# Patient Record
Sex: FEMALE | Race: WHITE | Employment: FULL TIME | URBAN - NONMETROPOLITAN AREA
[De-identification: names, ages, dates, MRNs, and addresses within clinical notes are randomized per-mention and may not be internally consistent; named-entity substitution may affect disease eponyms.]

---

## 2023-11-20 ENCOUNTER — OFFICE VISIT (OUTPATIENT)
Dept: PRIMARY CARE CLINIC | Age: 38
End: 2023-11-20
Payer: COMMERCIAL

## 2023-11-20 VITALS
SYSTOLIC BLOOD PRESSURE: 128 MMHG | BODY MASS INDEX: 43.49 KG/M2 | TEMPERATURE: 97.4 F | HEIGHT: 66 IN | DIASTOLIC BLOOD PRESSURE: 86 MMHG | WEIGHT: 270.6 LBS | RESPIRATION RATE: 18 BRPM | OXYGEN SATURATION: 98 % | HEART RATE: 95 BPM

## 2023-11-20 DIAGNOSIS — N92.1 EXCESSIVE AND FREQUENT MENSTRUATION WITH IRREGULAR CYCLE: ICD-10-CM

## 2023-11-20 DIAGNOSIS — E66.01 MORBID OBESITY (HCC): Primary | ICD-10-CM

## 2023-11-20 DIAGNOSIS — Z13.220 SCREENING FOR LIPID DISORDERS: ICD-10-CM

## 2023-11-20 DIAGNOSIS — E03.9 HYPOTHYROIDISM, UNSPECIFIED TYPE: ICD-10-CM

## 2023-11-20 DIAGNOSIS — E66.01 MORBID OBESITY (HCC): ICD-10-CM

## 2023-11-20 DIAGNOSIS — Z13.1 SCREENING FOR DIABETES MELLITUS (DM): ICD-10-CM

## 2023-11-20 LAB
ALBUMIN SERPL-MCNC: 4.3 G/DL (ref 3.5–5.2)
ALP SERPL-CCNC: 100 U/L (ref 35–104)
ALT SERPL-CCNC: 19 U/L (ref 5–33)
ANION GAP SERPL CALCULATED.3IONS-SCNC: 13 MMOL/L (ref 7–19)
AST SERPL-CCNC: 17 U/L (ref 5–32)
BASOPHILS # BLD: 0 K/UL (ref 0–0.2)
BASOPHILS NFR BLD: 0.5 % (ref 0–1)
BILIRUB SERPL-MCNC: 0.4 MG/DL (ref 0.2–1.2)
BUN SERPL-MCNC: 8 MG/DL (ref 6–20)
CALCIUM SERPL-MCNC: 9.3 MG/DL (ref 8.6–10)
CHLORIDE SERPL-SCNC: 104 MMOL/L (ref 98–111)
CHOLEST SERPL-MCNC: 204 MG/DL (ref 160–199)
CO2 SERPL-SCNC: 23 MMOL/L (ref 22–29)
CREAT SERPL-MCNC: 0.6 MG/DL (ref 0.5–0.9)
EOSINOPHIL # BLD: 0.2 K/UL (ref 0–0.6)
EOSINOPHIL NFR BLD: 2 % (ref 0–5)
ERYTHROCYTE [DISTWIDTH] IN BLOOD BY AUTOMATED COUNT: 13.5 % (ref 11.5–14.5)
ESTRADIOL SERPL-SCNC: 177.6 PG/ML
FSH SERPL-SCNC: 1.6 MIU/ML
GLUCOSE SERPL-MCNC: 96 MG/DL (ref 74–109)
HBA1C MFR BLD: 5.6 % (ref 4–6)
HCT VFR BLD AUTO: 41.3 % (ref 37–47)
HDLC SERPL-MCNC: 58 MG/DL (ref 65–121)
HGB BLD-MCNC: 13.1 G/DL (ref 12–16)
IMM GRANULOCYTES # BLD: 0 K/UL
LDLC SERPL CALC-MCNC: 126 MG/DL
LH SERPL-ACNC: 1.7 MIU/ML
LYMPHOCYTES # BLD: 2.9 K/UL (ref 1.1–4.5)
LYMPHOCYTES NFR BLD: 33.5 % (ref 20–40)
MCH RBC QN AUTO: 28.6 PG (ref 27–31)
MCHC RBC AUTO-ENTMCNC: 31.7 G/DL (ref 33–37)
MCV RBC AUTO: 90.2 FL (ref 81–99)
MONOCYTES # BLD: 0.4 K/UL (ref 0–0.9)
MONOCYTES NFR BLD: 4.4 % (ref 0–10)
NEUTROPHILS # BLD: 5.1 K/UL (ref 1.5–7.5)
NEUTS SEG NFR BLD: 59.4 % (ref 50–65)
PLATELET # BLD AUTO: 258 K/UL (ref 130–400)
PMV BLD AUTO: 11 FL (ref 9.4–12.3)
POTASSIUM SERPL-SCNC: 4.1 MMOL/L (ref 3.5–5)
PROGEST SERPL-MCNC: 4.59 NG/ML
PROT SERPL-MCNC: 7.9 G/DL (ref 6.6–8.7)
RBC # BLD AUTO: 4.58 M/UL (ref 4.2–5.4)
SODIUM SERPL-SCNC: 140 MMOL/L (ref 136–145)
T4 FREE SERPL-MCNC: 0.92 NG/DL (ref 0.93–1.7)
TRIGL SERPL-MCNC: 102 MG/DL (ref 0–149)
TSH SERPL DL<=0.005 MIU/L-ACNC: 3.64 UIU/ML (ref 0.27–4.2)
WBC # BLD AUTO: 8.6 K/UL (ref 4.8–10.8)

## 2023-11-20 PROCEDURE — 99204 OFFICE O/P NEW MOD 45 MIN: CPT | Performed by: NURSE PRACTITIONER

## 2023-11-20 SDOH — ECONOMIC STABILITY: FOOD INSECURITY: WITHIN THE PAST 12 MONTHS, YOU WORRIED THAT YOUR FOOD WOULD RUN OUT BEFORE YOU GOT MONEY TO BUY MORE.: NEVER TRUE

## 2023-11-20 SDOH — ECONOMIC STABILITY: INCOME INSECURITY: HOW HARD IS IT FOR YOU TO PAY FOR THE VERY BASICS LIKE FOOD, HOUSING, MEDICAL CARE, AND HEATING?: NOT VERY HARD

## 2023-11-20 SDOH — ECONOMIC STABILITY: HOUSING INSECURITY
IN THE LAST 12 MONTHS, WAS THERE A TIME WHEN YOU DID NOT HAVE A STEADY PLACE TO SLEEP OR SLEPT IN A SHELTER (INCLUDING NOW)?: NO

## 2023-11-20 SDOH — ECONOMIC STABILITY: FOOD INSECURITY: WITHIN THE PAST 12 MONTHS, THE FOOD YOU BOUGHT JUST DIDN'T LAST AND YOU DIDN'T HAVE MONEY TO GET MORE.: NEVER TRUE

## 2023-11-20 ASSESSMENT — PATIENT HEALTH QUESTIONNAIRE - PHQ9
SUM OF ALL RESPONSES TO PHQ QUESTIONS 1-9: 0
SUM OF ALL RESPONSES TO PHQ QUESTIONS 1-9: 0
1. LITTLE INTEREST OR PLEASURE IN DOING THINGS: 0
SUM OF ALL RESPONSES TO PHQ9 QUESTIONS 1 & 2: 0
SUM OF ALL RESPONSES TO PHQ QUESTIONS 1-9: 0
2. FEELING DOWN, DEPRESSED OR HOPELESS: 0
SUM OF ALL RESPONSES TO PHQ QUESTIONS 1-9: 0

## 2023-11-20 ASSESSMENT — ENCOUNTER SYMPTOMS
EYES NEGATIVE: 1
GASTROINTESTINAL NEGATIVE: 1
ABDOMINAL PAIN: 0
COUGH: 0
ABDOMINAL DISTENTION: 0
NAUSEA: 0
SHORTNESS OF BREATH: 0
VOMITING: 0
CONSTIPATION: 0
DIARRHEA: 0
WHEEZING: 0

## 2023-11-20 NOTE — PATIENT INSTRUCTIONS
Patient is encouraged to consider a local training or weight loss program such as:  [place] on [address] or [address]. [facility]  Dr. Kevin Shone Weight Loss program  Or other weight loss programs through a local gym. [employer]    Accountability and consistency are two keys to success to long term weight loss. BMR    10 x (weight in kilogram) + 6.25 (height in centimeters) - 5 (age) - 161 = BMR  BMR x 1.2 ( for those who are not overly active) = caloric deficit     The fundamentals of weight loss come down to the fact that calories in have to be less than calories out which results in a calorie deficit. However healthy weight loss will also require you to meet your basal metabolic rate. While some of us eat way too many calories, others eat way too few causing the body to store most intake due to metabolic adaptation. Increase water intake with a goal of 1 gallon per day. Download a calorie counting camille such as my fitness pal or other program.    Keep a journal of everything that you eat or drink for 2 weeks. At the end of each day calculate your caloric intake. This gives you knowledge of what you are actually getting in each day. By calculating your BMR you will know the amount of calories that your body needs every day to function appropriately. Focus on making better nutritional choices, eliminate simple sugars or sugary drinks such as soda or tea. Limit starchy vegetables such as potatoes or corn. Use healthier choices of breads and pastas such as Banza pasta, Manish bread, low-carb wraps or keto bread. Eating plans such as the [address], [employer] or [address] can be implemented to help guide eating habits. Slowly increase physical exercise to include 20 to 30 minutes of moderate exercise 3 to 5 days a week.

## 2023-11-20 NOTE — PROGRESS NOTES
[facility] PHYSICIAN SERVICES  [address] [address] [address] [address]  Dept: [phone]  Dept Fax: [phone]  Loc: [phone]    Ron Ryan is a 34 y.o. female who presents today for her medical conditions/complaints as noted below. Ron Ryan is c/o of New Patient (Pt  is here to establish care. Pt is fasting. ), Weight Management (Pt would like to discuss her weight. Pt states she was dx with hypothyroidism and  was started on synthroid and kept gaining weight. Pt states she got fed up and slowly got fed up and stopped taking the medication and has still gained weight. ), and Menstrual Problem (Pt states her period has started coming every 2 weeks. )        HPI:     HPI this 68-year-old female presents today to establish care. She states that she has concerns with her weight. She states that she was diagnosed as having hypothyroidism years ago. States that she got the run around from several different physicians. She states that she got fed up with the medical system and stopped everything. States she also has issues with menstrual cycles now coming every 2 weeks instead of once a month. She states that she has significant bleeding and cramping with these. States she has her entire life. She states that she is not on any type of birth control at this time. She is not taking any medication for thyroid at this time. Chief Complaint   Patient presents with    New Patient     Pt  is here to establish care. Pt is fasting. Weight Management     Pt would like to discuss her weight. Pt states she was dx with hypothyroidism and  was started on synthroid and kept gaining weight. Pt states she got fed up and slowly got fed up and stopped taking the medication and has still gained weight. Menstrual Problem     Pt states her period has started coming every 2 weeks.       Past Medical History:   Diagnosis Date    Hypothyroidism       Past Surgical History:   Procedure

## 2023-11-23 LAB
INSULIN FREE SERPL-ACNC: 13 UIU/ML (ref 3–25)
INSULIN SERPL-ACNC: 18 UIU/ML (ref 3–25)

## 2023-11-25 LAB
SHBG SERPL-SCNC: 54 NMOL/L (ref 25–122)
TESTOST FREE SERPL-MCNC: 2.5 PG/ML (ref 1.3–9.2)
TESTOST SERPL-MCNC: 20 NG/DL (ref 9–55)

## 2023-12-05 ENCOUNTER — OFFICE VISIT (OUTPATIENT)
Dept: PRIMARY CARE CLINIC | Age: 38
End: 2023-12-05
Payer: COMMERCIAL

## 2023-12-05 VITALS
WEIGHT: 269.6 LBS | RESPIRATION RATE: 18 BRPM | TEMPERATURE: 98.6 F | HEIGHT: 66 IN | OXYGEN SATURATION: 97 % | DIASTOLIC BLOOD PRESSURE: 80 MMHG | HEART RATE: 104 BPM | BODY MASS INDEX: 43.33 KG/M2 | SYSTOLIC BLOOD PRESSURE: 124 MMHG

## 2023-12-05 DIAGNOSIS — H60.503 ACUTE OTITIS EXTERNA OF BOTH EARS, UNSPECIFIED TYPE: Primary | ICD-10-CM

## 2023-12-05 DIAGNOSIS — R05.9 COUGH, UNSPECIFIED TYPE: ICD-10-CM

## 2023-12-05 DIAGNOSIS — R50.9 FEVER, UNSPECIFIED FEVER CAUSE: ICD-10-CM

## 2023-12-05 DIAGNOSIS — B34.9 VIRAL SYNDROME: ICD-10-CM

## 2023-12-05 DIAGNOSIS — R09.81 CONGESTION OF NASAL SINUS: ICD-10-CM

## 2023-12-05 LAB
INFLUENZA A ANTIBODY: NEGATIVE
INFLUENZA B ANTIBODY: NEGATIVE
S PYO AG THROAT QL: NORMAL

## 2023-12-05 PROCEDURE — 99213 OFFICE O/P EST LOW 20 MIN: CPT | Performed by: NURSE PRACTITIONER

## 2023-12-05 RX ORDER — ACETAMINOPHEN 500 MG
500 TABLET ORAL EVERY 6 HOURS PRN
COMMUNITY

## 2023-12-05 RX ORDER — FLUTICASONE PROPIONATE 50 MCG
1 SPRAY, SUSPENSION (ML) NASAL DAILY
Qty: 32 G | Refills: 1 | Status: SHIPPED | OUTPATIENT
Start: 2023-12-05

## 2023-12-05 RX ORDER — NEOMYCIN SULFATE, POLYMYXIN B SULFATE AND HYDROCORTISONE 10; 3.5; 1 MG/ML; MG/ML; [USP'U]/ML
3 SUSPENSION/ DROPS AURICULAR (OTIC) 4 TIMES DAILY
Qty: 1 EACH | Refills: 0 | Status: SHIPPED | OUTPATIENT
Start: 2023-12-05 | End: 2023-12-15

## 2023-12-05 RX ORDER — METHYLPREDNISOLONE 4 MG/1
TABLET ORAL
Qty: 1 KIT | Refills: 0 | Status: SHIPPED | OUTPATIENT
Start: 2023-12-05 | End: 2023-12-11

## 2023-12-05 RX ORDER — ACETAMINOPHEN, GUAIFENESIN 325; 200 MG/1; MG/1
CAPSULE, LIQUID FILLED ORAL
COMMUNITY

## 2023-12-05 ASSESSMENT — ENCOUNTER SYMPTOMS
RHINORRHEA: 1
EYES NEGATIVE: 1
ABDOMINAL PAIN: 0
ALLERGIC/IMMUNOLOGIC NEGATIVE: 1
SORE THROAT: 0
NAUSEA: 0
DIARRHEA: 0
COUGH: 1
GASTROINTESTINAL NEGATIVE: 1
RECTAL PAIN: 0

## 2023-12-05 NOTE — PATIENT INSTRUCTIONS
Viral syndrome   Generally, I recommend Flonase daily for 14 days along with a potent antihistamine such as Allegra D, Zyrtec D or Claritin D for 14 days. If you have Blood pressure problems you may not be able to tolerate the D version. Other OTC nasal sprays such as saline spray, Vicks or Naso bull can also be helpful. People with high blood pressure may use Coricidin HBP or Benadryl for sinus   symptoms. Oscillococcinum may be helpful for flu or flu-like symptoms. Supplement with Vit D3 5000 units daily, Vit C 1000 mg daily and Zinc 50 mg daily. Many illnesses are caused by viruses. These conditions usually run their course in 7-14 days. Antibiotics do not help fight viral infections and are not needed at this time. Viral syndromes are treated with symptomatic support. You may take tylenol or ibuprofen for fever or aches and pains. Stay hydrated by taking sips of water or non caffeinated, noncarbonated, and nonalcoholic beverages throughout the day. For sore throat, you may gargle with warm salt water, use lozenges or sprays. Hot tea with honey may also be soothing to the throat. Using a daily antihistamine such as Claritin, Zyrtec or Allegra can help with upper respiratory symptoms. Benadryl can be sedating but is helpful at drying secretions and may be taken at night. For earaches, microwave a wet washcloth for 2 mins then using tongs (do not touch as it may scald you ) place cloth in ceramic cup and hold up to ear. The moist heat can be soothing to the ear. Call if you have a fever greater than 102 F or if symptoms do not improve. Your provider may also send you in prescription medications depending on your symptoms and their severity. Take all medications as directed on package unless specifically told otherwise.

## 2024-01-08 ENCOUNTER — OFFICE VISIT (OUTPATIENT)
Dept: PRIMARY CARE CLINIC | Age: 39
End: 2024-01-08
Payer: COMMERCIAL

## 2024-01-08 VITALS
TEMPERATURE: 97 F | HEIGHT: 66 IN | OXYGEN SATURATION: 98 % | RESPIRATION RATE: 18 BRPM | WEIGHT: 268.2 LBS | HEART RATE: 91 BPM | DIASTOLIC BLOOD PRESSURE: 76 MMHG | BODY MASS INDEX: 43.1 KG/M2 | SYSTOLIC BLOOD PRESSURE: 122 MMHG

## 2024-01-08 DIAGNOSIS — E03.9 HYPOTHYROIDISM, UNSPECIFIED TYPE: ICD-10-CM

## 2024-01-08 DIAGNOSIS — E66.01 MORBID OBESITY (HCC): Primary | ICD-10-CM

## 2024-01-08 PROCEDURE — 99213 OFFICE O/P EST LOW 20 MIN: CPT | Performed by: NURSE PRACTITIONER

## 2024-01-08 ASSESSMENT — ENCOUNTER SYMPTOMS
COUGH: 0
EYES NEGATIVE: 1
NAUSEA: 0
SHORTNESS OF BREATH: 0
ALLERGIC/IMMUNOLOGIC NEGATIVE: 1
GASTROINTESTINAL NEGATIVE: 1
WHEEZING: 0
VOMITING: 0
ABDOMINAL PAIN: 0
RESPIRATORY NEGATIVE: 1
CONSTIPATION: 0
DIARRHEA: 0

## 2024-01-08 ASSESSMENT — PATIENT HEALTH QUESTIONNAIRE - PHQ9
1. LITTLE INTEREST OR PLEASURE IN DOING THINGS: 0
SUM OF ALL RESPONSES TO PHQ QUESTIONS 1-9: 0
SUM OF ALL RESPONSES TO PHQ QUESTIONS 1-9: 0
SUM OF ALL RESPONSES TO PHQ9 QUESTIONS 1 & 2: 0
SUM OF ALL RESPONSES TO PHQ QUESTIONS 1-9: 0
2. FEELING DOWN, DEPRESSED OR HOPELESS: 0
SUM OF ALL RESPONSES TO PHQ QUESTIONS 1-9: 0

## 2024-01-08 NOTE — PATIENT INSTRUCTIONS
Mediterranean diet, Adrenaline Mobility diet or Trim Healthy Mama can be implemented to help guide eating habits.      Slowly increase physical exercise to include 20 to 30 minutes of moderate exercise 3 to 5 days a week.      The choice of anti-obesity drug depends upon patient comorbidities but also takes into account patient preferences, adverse effects, and insurance coverage and out-of-pocket costs. For most patients, a glucagon-like peptide 1 (GLP-1) agonist (eg, semaglutide. liraglutide) is the preferred first-line pharmacotherapy. Other options include combination phentermine-topiramate, orlistat, combination bupropion-naltrexone, and phentermine (as a single agent). The details of pharmacotherapy for the management of obesity are reviewed elsewhere. (See \"Obesity in adults: Drug therapy\", section on 'Sympathomimetic drugs' and \"Obesity in adults: Drug therapy\".)

## 2024-01-08 NOTE — PROGRESS NOTES
side effects of prescribed medications.  All patient questions answered.  Pt voiced understanding. Reviewed health maintenance.  Instructed to continue currentmedications, diet and exercise.  Patient agreed with treatment plan. Follow up as directed.   MEDICATIONS:  No orders of the defined types were placed in this encounter.        ORDERS:  No orders of the defined types were placed in this encounter.      Follow-up:  Return in about 4 weeks (around 2/5/2024).    PATIENT INSTRUCTIONS:  Patient Instructions   Water goal 128 oz daily  Protein > 100 gms daily       Patient is encouraged to consider a local training or weight loss program such as:  [employer] on [employer] or [employer]  [facility] program  Or other weight loss programs through a local gym.     I would recommend discussing options with a local weight loss clinic.  You may be able to qualify for one of the compounded formulas of one of the injectables through their programs. One possible consideration would be:    Janet BULLOCK   Weight management   [email or web address]      Accountability and consistency are two keys to success to long term weight loss.     BMR    10 x (weight in kilogram) + 6.25 (height in centimeters) - 5 (age) - 161 = BMR  BMR x 1.2 ( for those who are not overly active) = caloric deficit     The fundamentals of weight loss come down to the fact that calories in have to be less than calories out which results in a calorie deficit.  However healthy weight loss will also require you to meet your basal metabolic rate.  While some of us eat way too many calories, others eat way too few causing the body to store most intake due to metabolic adaptation.     Increase water intake with a goal of 1 gallon per day.    Download a calorie counting camille such as [facility] pal or other program.    Keep a journal of everything that you eat or drink for 2 weeks.    At the end of each day calculate

## 2024-04-08 ENCOUNTER — OFFICE VISIT (OUTPATIENT)
Dept: PRIMARY CARE CLINIC | Age: 39
End: 2024-04-08
Payer: COMMERCIAL

## 2024-04-08 VITALS
BODY MASS INDEX: 44.81 KG/M2 | TEMPERATURE: 97.3 F | OXYGEN SATURATION: 99 % | DIASTOLIC BLOOD PRESSURE: 86 MMHG | WEIGHT: 278.8 LBS | HEART RATE: 88 BPM | RESPIRATION RATE: 18 BRPM | SYSTOLIC BLOOD PRESSURE: 134 MMHG | HEIGHT: 66 IN

## 2024-04-08 DIAGNOSIS — Z12.4 ENCOUNTER FOR PAPANICOLAOU SMEAR FOR CERVICAL CANCER SCREENING: Primary | ICD-10-CM

## 2024-04-08 DIAGNOSIS — E66.01 MORBID OBESITY (HCC): ICD-10-CM

## 2024-04-08 DIAGNOSIS — N92.6 IRREGULAR MENSTRUAL CYCLE: ICD-10-CM

## 2024-04-08 DIAGNOSIS — R21 RASH: ICD-10-CM

## 2024-04-08 DIAGNOSIS — Z79.899 MEDICATION MANAGEMENT: ICD-10-CM

## 2024-04-08 PROCEDURE — 80305 DRUG TEST PRSMV DIR OPT OBS: CPT | Performed by: NURSE PRACTITIONER

## 2024-04-08 PROCEDURE — 99214 OFFICE O/P EST MOD 30 MIN: CPT | Performed by: NURSE PRACTITIONER

## 2024-04-08 RX ORDER — PHENTERMINE HYDROCHLORIDE 37.5 MG/1
37.5 TABLET ORAL
Qty: 30 TABLET | Refills: 0 | Status: SHIPPED | OUTPATIENT
Start: 2024-04-08 | End: 2024-05-08

## 2024-04-08 ASSESSMENT — ENCOUNTER SYMPTOMS
EYES NEGATIVE: 1
CONSTIPATION: 0
RESPIRATORY NEGATIVE: 1
NAUSEA: 0
DIARRHEA: 0
SHORTNESS OF BREATH: 0
VOMITING: 0
ABDOMINAL PAIN: 0
COUGH: 0
ABDOMINAL DISTENTION: 0
WHEEZING: 0
ALLERGIC/IMMUNOLOGIC NEGATIVE: 1
GASTROINTESTINAL NEGATIVE: 1

## 2024-04-08 NOTE — PROGRESS NOTES
[facility]  [place] [id number] [facility]  [place] [address]  Dept: [phone]  Dept Fax: [phone]  Loc: [phone]    Rani Rossi is a 38 y.o. female who presents today for her medical conditions/complaints as noted below.  Rani Rossi is c/o of 3 Month Follow-Up (Pt is here for 3 month follow up on medication. Pt states the metformin has helped regulate her period, but has not had any GI issues. She states she has changed to the mediterranean diet, but is still gaining weight. ) and Rash (PT has developed a rash on her forehead and her nose that she first noticed about a month ago. Pt states it does itch. Pt had started a cream in December so she stopped it about a month go, but still has the rash. )        HPI:     HPI this 38-year-old female presents today for 3-month follow-up.  She states that the metformin has regulated her irregular menstrual cycle.  States that she has recurrence clockwork now.  She does report that she developed a rash on her forehead that was itchy after starting the metformin.  States that she also started Allegra-D about that time and states that it was not there while she was taking the Allegra-D but did start back after she stopped it.  She does not know if it is related to the metformin or she had also started new facial cleansers at that time.  Chief Complaint   Patient presents with    3 Month Follow-Up     Pt is here for 3 month follow up on medication. Pt states the metformin has helped regulate her period, but has not had any GI issues. She states she has changed to the mediterranean diet, but is still gaining weight.     Rash     PT has developed a rash on her forehead and her nose that she first noticed about a month ago. Pt states it does itch. Pt had started a cream in December so she stopped it about a month go, but still has the rash.      Past Medical History:   Diagnosis Date    Hypothyroidism       Past Surgical History:

## 2024-04-08 NOTE — PATIENT INSTRUCTIONS
Goal protein   Goal water   Patient is encouraged to consider a local training or weight loss program such as:  [employer] on [employer] or [employer]  [facility] program  Or other weight loss programs through a local gym.     Accountability and consistency are two keys to success to long term weight loss.     BMR    10 x (weight in kilogram) + 6.25 (height in centimeters) - 5 (age) - 161 = BMR  BMR x 1.2 ( for those who are not overly active) = caloric deficit     The fundamentals of weight loss come down to the fact that calories in have to be less than calories out which results in a calorie deficit.  However healthy weight loss will also require you to meet your basal metabolic rate.  While some of us eat way too many calories, others eat way too few causing the body to store most intake due to metabolic adaptation.     Increase water intake with a goal of 1 gallon per day.    Download a calorie counting camille such as my [email or web address] pal or other program.    Keep a journal of everything that you eat or drink for 2 weeks.    At the end of each day calculate your caloric intake.  This gives you knowledge of what you are actually getting in each day.  By calculating your BMR you will know the amount of calories that your body needs every day to function appropriately.  Focus on making better nutritional choices, eliminate simple sugars or sugary drinks such as soda or tea.  Limit starchy vegetables such as potatoes or corn.  Use healthier choices of breads and pastas such as Banza pasta, Manish bread, low-carb wraps or keto bread.     Eating plans such as the Mediterranean diet, [place] diet or Trim Healthy Mama can be implemented to help guide eating habits.      Slowly increase physical exercise to include 20 to 30 minutes of moderate exercise 3 to 5 days a week.

## 2024-04-16 ENCOUNTER — NURSE ONLY (OUTPATIENT)
Dept: PRIMARY CARE CLINIC | Age: 39
End: 2024-04-16

## 2024-04-16 VITALS — BODY MASS INDEX: 44 KG/M2 | WEIGHT: 272.6 LBS

## 2024-04-23 ENCOUNTER — NURSE ONLY (OUTPATIENT)
Dept: PRIMARY CARE CLINIC | Age: 39
End: 2024-04-23

## 2024-04-23 VITALS — BODY MASS INDEX: 43.17 KG/M2 | HEIGHT: 66 IN | WEIGHT: 268.6 LBS

## 2024-04-23 NOTE — PROGRESS NOTES
Patient was seen today for weight check. She is taking her metformin and phentermine as directed. Notified pt that PCP would be notified. Patient voiced understanding.

## 2024-04-30 ENCOUNTER — NURSE ONLY (OUTPATIENT)
Dept: PRIMARY CARE CLINIC | Age: 39
End: 2024-04-30

## 2024-04-30 VITALS — BODY MASS INDEX: 43.09 KG/M2 | WEIGHT: 267 LBS

## 2024-05-10 ENCOUNTER — OFFICE VISIT (OUTPATIENT)
Dept: PRIMARY CARE CLINIC | Age: 39
End: 2024-05-10
Payer: COMMERCIAL

## 2024-05-10 VITALS
RESPIRATION RATE: 18 BRPM | HEIGHT: 66 IN | WEIGHT: 264 LBS | BODY MASS INDEX: 42.43 KG/M2 | TEMPERATURE: 97.6 F | SYSTOLIC BLOOD PRESSURE: 118 MMHG | DIASTOLIC BLOOD PRESSURE: 76 MMHG | OXYGEN SATURATION: 97 % | HEART RATE: 81 BPM

## 2024-05-10 DIAGNOSIS — E66.01 MORBID OBESITY (HCC): Primary | ICD-10-CM

## 2024-05-10 PROCEDURE — 99214 OFFICE O/P EST MOD 30 MIN: CPT | Performed by: NURSE PRACTITIONER

## 2024-05-10 RX ORDER — PHENTERMINE HYDROCHLORIDE 37.5 MG/1
37.5 TABLET ORAL
COMMUNITY
End: 2024-05-10 | Stop reason: SDUPTHER

## 2024-05-10 NOTE — PROGRESS NOTES
[facility]  [place] [id number] [facility]  [place] [address]  Dept: [phone]  Dept Fax: [phone]  Loc: [phone]    Rani Rossi is a 38 y.o. female who presents today for her medical conditions/complaints as noted below.  Rani Rossi is c/o of 1 Month Follow-Up (Pt is here for 1 month follow up on the phentermine. Pt states she feels more energetic and feels good. Pt denies side effects or adverse effects. No concerns. )        HPI:     HPI this patient states that she is doing excellent on the medication.  She continues to lose weight.  States that she has been getting complements from her  which is really help boost her self-esteem.  Also states that she was able to get into Lindsay that she has not been able to wear in a long time.  She states that she feels like it is doing an excellent job helping her to curb her appetite or to have her more energy so she can do the things like increasing her activity she states that she is sleeping well.  States that she is not having any issues with her blood pressure or heart rate.  Chief Complaint   Patient presents with    1 Month Follow-Up     Pt is here for 1 month follow up on the phentermine. Pt states she feels more energetic and feels good. Pt denies side effects or adverse effects. No concerns.      Past Medical History:   Diagnosis Date    Hypothyroidism       Past Surgical History:   Procedure Laterality Date    TONSILLECTOMY             5/10/2024     8:16 AM 4/30/2024    11:19 AM 4/23/2024    11:15 AM 4/16/2024    11:15 AM 4/8/2024    11:13 AM 1/8/2024    11:02 AM   Vitals   SYSTOLIC 118    134 122   DIASTOLIC 76    86 76   Site Left Upper Arm    Left Upper Arm Left Upper Arm   Position Sitting    Sitting Sitting   Cuff Size Large Adult    Large Adult Large Adult   Pulse 81    88 91   Temp 97.6 °F (36.4 °C)    97.3 °F (36.3 °C) 97 °F (36.1 °C)   Respirations 18    18 18   SpO2 97 %    99 % 98 %   Weight - Scale

## 2024-05-10 NOTE — PATIENT INSTRUCTIONS
Patient is encouraged to consider a local training or weight loss program such as:  [employer] [employer] [place] on [employer] or [employer]  [facility] program  Or other weight loss programs through a local gym.     Accountability and consistency are two keys to success to long term weight loss.     BMR    10 x (weight in kilogram) + 6.25 (height in centimeters) - 5 (age) - 161 = BMR  BMR x 1.2 ( for those who are not overly active) = caloric deficit     The fundamentals of weight loss come down to the fact that calories in have to be less than calories out which results in a calorie deficit.  However healthy weight loss will also require you to meet your basal metabolic rate.  While some of us eat way too many calories, others eat way too few causing the body to store most intake due to metabolic adaptation.     Increase water intake with a goal of 1 gallon per day.    Download a calorie counting camille such as my fitness pal or other program.    Keep a journal of everything that you eat or drink for 2 weeks.    At the end of each day calculate your caloric intake.  This gives you knowledge of what you are actually getting in each day.  By calculating your BMR you will know the amount of calories that your body needs every day to function appropriately.  Focus on making better nutritional choices, eliminate simple sugars or sugary drinks such as soda or tea.  Limit starchy vegetables such as potatoes or corn.  Use healthier choices of breads and pastas such as Banza pasta, Manish bread, low-carb wraps or keto bread.     Eating plans such as the Mediterranean diet, [place] diet or Trim Healthy Mama can be implemented to help guide eating habits.      Slowly increase physical exercise to include 20 to 30 minutes of moderate exercise 3 to 5 days a week.

## 2024-05-13 RX ORDER — PHENTERMINE HYDROCHLORIDE 37.5 MG/1
37.5 TABLET ORAL
Qty: 30 TABLET | Refills: 0 | Status: SHIPPED | OUTPATIENT
Start: 2024-05-13 | End: 2024-06-12

## 2024-05-13 ASSESSMENT — ENCOUNTER SYMPTOMS
VOMITING: 0
DIARRHEA: 0
ABDOMINAL PAIN: 0

## 2024-06-12 DIAGNOSIS — E66.01 MORBID OBESITY (HCC): ICD-10-CM

## 2024-06-12 NOTE — TELEPHONE ENCOUNTER
PDMP Monitoring:    Last PDMP [place] as Reviewed ([place]):  Review User Review Instant Review Result   RESTREPOANDREZ JACKSONMANDA MONTIEL 5/13/2024  8:20 AM Reviewed PDMP [1]     Urine Drug Screenings (1 yr)       POCT Rapid Drug Screen  Collected: 4/8/2024 11:59 AM (Final result)                  Medication Contract and Consent for Opioid Use Documents Filed       Patient Documents       Type of Document Status Date Received Received By Description    Medication Contract Signed 12/16/2013  8:20 AM MARILUZ ADLER     Medication Contract Received 4/8/2024  5:01 PM ESCOBAR LIMON Medication Contract 4/8/2024

## 2024-06-13 RX ORDER — PHENTERMINE HYDROCHLORIDE 37.5 MG/1
37.5 TABLET ORAL
Qty: 30 TABLET | Refills: 0 | Status: SHIPPED | OUTPATIENT
Start: 2024-06-13 | End: 2024-07-13

## 2024-08-08 ENCOUNTER — OFFICE VISIT (OUTPATIENT)
Dept: PRIMARY CARE CLINIC | Age: 39
End: 2024-08-08
Payer: COMMERCIAL

## 2024-08-08 VITALS
TEMPERATURE: 97.7 F | OXYGEN SATURATION: 98 % | DIASTOLIC BLOOD PRESSURE: 82 MMHG | WEIGHT: 251.2 LBS | SYSTOLIC BLOOD PRESSURE: 116 MMHG | BODY MASS INDEX: 40.37 KG/M2 | HEIGHT: 66 IN | HEART RATE: 92 BPM | RESPIRATION RATE: 18 BRPM

## 2024-08-08 DIAGNOSIS — E66.01 MORBID OBESITY (HCC): Primary | ICD-10-CM

## 2024-08-08 PROCEDURE — 99214 OFFICE O/P EST MOD 30 MIN: CPT | Performed by: NURSE PRACTITIONER

## 2024-08-08 NOTE — PROGRESS NOTES
[facility]  [address]  [place] [address]  Dept: [phone]  Dept Fax: [phone]  Loc: [phone]    Rani Rossi is a 39 y.o. female who presents today for her medical conditions/complaints as noted below.  Rani Rossi is c/o of 3 Month Follow-Up (Pt is here for 3 month follow up on weight management. Pt is confused. She was told to follow up and requested a refill of her phentermine, but was told she couldn't get it until she was seen. )        HPI:     HPI this 39-year-old female presents today for follow-up on her weight management.  States that she is still being very successful using the phentermine and metformin.  She states that she had tried to get her refill but was told that she had to come in before that could be happen.  Patient states that at her last visit we discussed that she could return in 3 months because she is doing so well.  And she is stable with no concern for abuse.  Chief Complaint   Patient presents with    3 Month Follow-Up     Pt is here for 3 month follow up on weight management. Pt is confused. She was told to follow up and requested a refill of her phentermine, but was told she couldn't get it until she was seen.      Past Medical History:   Diagnosis Date    Hypothyroidism       Past Surgical History:   Procedure Laterality Date    TONSILLECTOMY             8/8/2024     3:33 PM 5/10/2024     8:16 AM 4/30/2024    11:19 AM 4/23/2024    11:15 AM 4/16/2024    11:15 AM 4/8/2024    11:13 AM   Vitals   SYSTOLIC 116 118    134   DIASTOLIC 82 76    86   Site Left Upper Arm Left Upper Arm    Left Upper Arm   Position Sitting Sitting    Sitting   Cuff Size Large Adult Large Adult    Large Adult   Pulse 92 81    88   Temp 97.7 °F (36.5 °C) 97.6 °F (36.4 °C)    97.3 °F (36.3 °C)   Respirations 18 18    18   SpO2 98 % 97 %    99 %   Weight - Scale 251 lb 3.2 oz 264 lb 267 lb 268 lb 9.6 oz 272 lb 9.6 oz 278 lb 12.8 oz   Height 5' 6\" 5' 6\"

## 2024-08-08 NOTE — PATIENT INSTRUCTIONS
Patient is encouraged to consider a local training or weight loss program such as:  [employer]ie [employer] on [employer] or [employer]  [facility] program  Or other weight loss programs through a local gym.     Accountability and consistency are two keys to success to long term weight loss.     BMR    10 x (weight in kilogram) + 6.25 (height in centimeters) - 5 (age) - 161 = BMR  BMR x 1.2 ( for those who are not overly active) = caloric deficit     The fundamentals of weight loss come down to the fact that calories in have to be less than calories out which results in a calorie deficit.  However healthy weight loss will also require you to meet your basal metabolic rate.  While some of us eat way too many calories, others eat way too few causing the body to store most intake due to metabolic adaptation.     Increase water intake with a goal of 1 gallon per day.    Download a calorie counting camille such as my fitness pal or other program.    Keep a journal of everything that you eat or drink for 2 weeks.    At the end of each day calculate your caloric intake.  This gives you knowledge of what you are actually getting in each day.  By calculating your BMR you will know the amount of calories that your body needs every day to function appropriately.  Focus on making better nutritional choices, eliminate simple sugars or sugary drinks such as soda or tea.  Limit starchy vegetables such as potatoes or corn.  Use healthier choices of breads and pastas such as Banza pasta, Manish bread, low-carb wraps or keto bread.     Eating plans such as the Mediterranean diet, [place] diet or Trim Healthy Mama can be implemented to help guide eating habits.      Slowly increase physical exercise to include 20 to 30 minutes of moderate exercise 3 to 5 days a week.    
no

## 2024-08-11 DIAGNOSIS — E66.01 MORBID OBESITY (HCC): ICD-10-CM

## 2024-08-12 RX ORDER — PHENTERMINE HYDROCHLORIDE 37.5 MG/1
37.5 TABLET ORAL
Qty: 30 TABLET | Refills: 0 | Status: SHIPPED | OUTPATIENT
Start: 2024-08-12 | End: 2024-08-13

## 2024-08-12 NOTE — TELEPHONE ENCOUNTER
Provider needs to review PDMP    PDMP Monitoring:    Last PDMP [place] as Reviewed ([place]):  Review User Review Instant Review Result   MIGUELITO RESTREPO 5/13/2024  8:20 AM Reviewed PDMP [1]     Urine Drug Screenings (1 yr)       POCT Rapid Drug Screen  Collected: 4/8/2024 11:59 AM (Final result)                  Medication Contract and Consent for Opioid Use Documents Filed       Patient Documents       Type of Document Status Date Received Received By Description    Medication Contract Signed 12/16/2013  8:20 AM MARILUZ ADLER     Medication Contract Received 4/8/2024  5:01 PM ESCOBAR LIMON Medication Contract 4/8/2024

## 2024-08-15 RX ORDER — PHENTERMINE HYDROCHLORIDE 37.5 MG/1
37.5 TABLET ORAL
Qty: 30 TABLET | Refills: 0 | Status: SHIPPED | OUTPATIENT
Start: 2024-08-15 | End: 2024-09-14

## 2024-08-15 ASSESSMENT — ENCOUNTER SYMPTOMS
SHORTNESS OF BREATH: 0
VOMITING: 0
NAUSEA: 0
COUGH: 0
ABDOMINAL PAIN: 0
WHEEZING: 0
ALLERGIC/IMMUNOLOGIC NEGATIVE: 1
GASTROINTESTINAL NEGATIVE: 1
RESPIRATORY NEGATIVE: 1
EYES NEGATIVE: 1
ABDOMINAL DISTENTION: 0
CONSTIPATION: 0
DIARRHEA: 0

## 2024-09-17 DIAGNOSIS — E66.01 MORBID OBESITY (HCC): ICD-10-CM

## 2024-09-17 RX ORDER — PHENTERMINE HYDROCHLORIDE 37.5 MG/1
37.5 TABLET ORAL
Qty: 30 TABLET | Refills: 0 | Status: SHIPPED | OUTPATIENT
Start: 2024-09-17 | End: 2024-10-17

## 2024-11-06 DIAGNOSIS — E66.01 MORBID OBESITY: ICD-10-CM

## 2024-11-06 NOTE — TELEPHONE ENCOUNTER
PDMP Monitoring:    Last PDMP [place] as Reviewed ([place]):  Review User Review Instant Review Result   RESTREPOANDREZ JACKSONMANDA MONTIEL 8/15/2024  7:22 AM Reviewed PDMP [1]     Urine Drug Screenings (1 yr)       POCT Rapid Drug Screen  Collected: 4/8/2024 11:59 AM (Final result)                  Medication Contract and Consent for Opioid Use Documents Filed       Patient Documents       Type of Document Status Date Received Received By Description    Medication Contract Signed 12/16/2013  8:20 AM MARILUZ ADLER     Medication Contract Received 4/8/2024  5:01 PM ESCOBAR LIMON Medication Contract 4/8/2024

## 2024-11-07 RX ORDER — PHENTERMINE HYDROCHLORIDE 37.5 MG/1
37.5 TABLET ORAL
Qty: 30 TABLET | Refills: 0 | Status: SHIPPED | OUTPATIENT
Start: 2024-11-07 | End: 2024-12-07

## 2024-11-20 ENCOUNTER — HOSPITAL ENCOUNTER (EMERGENCY)
Age: 39
Discharge: HOME | End: 2024-11-20
Payer: COMMERCIAL

## 2024-11-20 ENCOUNTER — APPOINTMENT (OUTPATIENT)
Dept: GENERAL RADIOLOGY | Age: 39
End: 2024-11-20
Payer: COMMERCIAL

## 2024-11-20 VITALS
TEMPERATURE: 97.7 F | SYSTOLIC BLOOD PRESSURE: 128 MMHG | HEIGHT: 66 IN | RESPIRATION RATE: 17 BRPM | OXYGEN SATURATION: 99 % | BODY MASS INDEX: 38.41 KG/M2 | WEIGHT: 239 LBS | HEART RATE: 79 BPM | DIASTOLIC BLOOD PRESSURE: 78 MMHG

## 2024-11-20 DIAGNOSIS — S89.92XA LEFT KNEE INJURY, INITIAL ENCOUNTER: Primary | ICD-10-CM

## 2024-11-20 PROCEDURE — 73562 X-RAY EXAM OF KNEE 3: CPT

## 2024-11-20 PROCEDURE — 99283 EMERGENCY DEPT VISIT LOW MDM: CPT

## 2024-11-20 RX ORDER — PREDNISONE 10 MG/1
10 TABLET ORAL DAILY
Qty: 10 TABLET | Refills: 0 | Status: SHIPPED | OUTPATIENT
Start: 2024-11-20 | End: 2024-11-30

## 2024-11-20 ASSESSMENT — ENCOUNTER SYMPTOMS
NAUSEA: 0
PHOTOPHOBIA: 0
SORE THROAT: 0
APNEA: 0
COLOR CHANGE: 0
RHINORRHEA: 0
BACK PAIN: 0
EYE PAIN: 0
COUGH: 0
EYE DISCHARGE: 0
SHORTNESS OF BREATH: 0
ABDOMINAL PAIN: 0
ABDOMINAL DISTENTION: 0

## 2024-11-20 NOTE — ED PROVIDER NOTES
[facility] EMERGENCY DEPT  eMERGENCYdEPARTMENT eNCOUnter      Pt Name: Rani Rossi  MRN: [id number]  Birthdate 1985  Date of evaluation: 11/20/2024  Provider:NICOLETTE Lacy    CHIEF COMPLAINT       Chief Complaint   Patient presents with    Knee Injury     Left knee         HISTORY OF PRESENT ILLNESS  (Location/Symptom, Timing/Onset, Context/Setting, Quality, Duration, Modifying Factors, Severity.)   Rani Rossi is a 39 y.o. female who presents to the emergency department with left knee and after landing on hands last night left knee hit ground hard. Has no swelling. She states clicking noted. She has injured other knee before. Obese. Tripped on dog bowl. No pain in left hip or ankle.    HPI    Nursing Notes were reviewed and I agree.    REVIEW OF SYSTEMS    (2-9 systems for level 4, 10 or more for level 5)     Review of Systems   Constitutional:  Negative for activity change, appetite change, chills and fever.   HENT:  Negative for congestion, postnasal drip, rhinorrhea and sore throat.    Eyes:  Negative for photophobia, pain, discharge and visual disturbance.   Respiratory:  Negative for apnea, cough and shortness of breath.    Cardiovascular:  Negative for chest pain and leg swelling.   Gastrointestinal:  Negative for abdominal distention, abdominal pain and nausea.   Genitourinary:  Negative for vaginal bleeding.   Musculoskeletal:  Positive for arthralgias. Negative for back pain, joint swelling, neck pain and neck stiffness.   Skin:  Negative for color change and rash.   Neurological:  Negative for dizziness, syncope, facial asymmetry and headaches.   Hematological:  Negative for adenopathy. Does not bruise/bleed easily.   Psychiatric/Behavioral:  Negative for agitation, behavioral problems and confusion.         Except as noted above the remainder of the review of systems was reviewed and negative.       PAST MEDICAL HISTORY     Past Medical History:   Diagnosis Date    Hypothyroidism          SURGICAL

## 2024-11-26 ENCOUNTER — OFFICE VISIT (OUTPATIENT)
Age: 39
End: 2024-11-26
Payer: COMMERCIAL

## 2024-11-26 VITALS — WEIGHT: 239.2 LBS | HEIGHT: 66 IN | BODY MASS INDEX: 38.44 KG/M2

## 2024-11-26 DIAGNOSIS — S83.412A SPRAIN OF MEDIAL COLLATERAL LIGAMENT OF LEFT KNEE, INITIAL ENCOUNTER: ICD-10-CM

## 2024-11-26 DIAGNOSIS — S80.02XA CONTUSION OF LEFT KNEE, INITIAL ENCOUNTER: ICD-10-CM

## 2024-11-26 DIAGNOSIS — M25.562 ACUTE PAIN OF LEFT KNEE: Primary | ICD-10-CM

## 2024-11-26 PROCEDURE — 99203 OFFICE O/P NEW LOW 30 MIN: CPT | Performed by: PHYSICIAN ASSISTANT

## 2024-11-26 NOTE — PROGRESS NOTES
Orthopaedic History and Physical - New Patient    NAME:  Rani Rossi   : 1985  MRN: [id number]      2024     CHIEF COMPLAINT:    Chief Complaint   Patient presents with    Knee Pain     Left knee pain        HISTORY OF PRESENT ILLNESS:   The patient is a 39 y.o. female who presents to the office for evaluation and treatment of left knee pain post fall on left knee.Pain began 24 she was at home and tripped over the mat her dog's bowl was on and fell hard to the ground injuring the left knee associated with a traumatic event. Pain is described as GS PAIN CHARACTER: dull, aching, and localized, associated with Associated symptoms: none worse with Pain worse when: at night. Treatment has consisted of ER visit, immobilizer and crutches.  Pain is rated a 5/10 and located on the left knee.    Past Medical History:        Diagnosis Date    Hypothyroidism        Past Surgical History:        Procedure Laterality Date    TONSILLECTOMY         Current Medications:   Prior to Admission medications    Medication Sig Start Date End Date Taking? Authorizing Provider   metFORMIN (GLUCOPHAGE) 500 MG tablet Take 1 tablet by mouth 2 times daily (with meals) 24  Yes Brianna Andrade APRN - NP   phentermine (ADIPEX-P) 37.5 MG tablet Take 1 tablet by mouth every morning (before breakfast) for 30 days. Max Daily Amount: 37.5 mg 24 Yes Brianna Andrade APRN - NP   acetaminophen (TYLENOL) 500 MG tablet Take 1 tablet by mouth every 6 hours as needed for Pain   Yes ProviderNupur MD   fluticasone (FLONASE) 50 MCG/ACT nasal spray 1 spray by Each Nostril route daily 23  Yes Brianna Andrade APRN - NP   IBUPROFEN PO Take  by mouth.   Yes ProviderNupur MD   predniSONE (DELTASONE) 10 MG tablet Take 1 tablet by mouth daily for 10 days  Patient not taking: Reported on 2024  Ezekiel Woods PA       Allergies:  Pineapple    Social History:   Social History     Socioeconomic

## 2024-12-02 ENCOUNTER — OFFICE VISIT (OUTPATIENT)
Dept: PRIMARY CARE CLINIC | Age: 39
End: 2024-12-02
Payer: COMMERCIAL

## 2024-12-02 VITALS
HEIGHT: 66 IN | DIASTOLIC BLOOD PRESSURE: 86 MMHG | SYSTOLIC BLOOD PRESSURE: 130 MMHG | WEIGHT: 250 LBS | HEART RATE: 80 BPM | BODY MASS INDEX: 40.18 KG/M2 | OXYGEN SATURATION: 97 % | RESPIRATION RATE: 18 BRPM | TEMPERATURE: 97.8 F

## 2024-12-02 DIAGNOSIS — E03.9 HYPOTHYROIDISM, UNSPECIFIED TYPE: ICD-10-CM

## 2024-12-02 DIAGNOSIS — Z13.220 SCREENING FOR LIPID DISORDERS: ICD-10-CM

## 2024-12-02 DIAGNOSIS — E66.01 MORBID OBESITY: Primary | ICD-10-CM

## 2024-12-02 DIAGNOSIS — Z13.1 SCREENING FOR DIABETES MELLITUS (DM): ICD-10-CM

## 2024-12-02 LAB
ALBUMIN SERPL-MCNC: 4 G/DL (ref 3.5–5.2)
ALP SERPL-CCNC: 76 U/L (ref 35–104)
ALT SERPL-CCNC: 12 U/L (ref 5–33)
ANION GAP SERPL CALCULATED.3IONS-SCNC: 9 MMOL/L (ref 7–19)
AST SERPL-CCNC: 10 U/L (ref 5–32)
BASOPHILS # BLD: 0.1 K/UL (ref 0–0.2)
BASOPHILS NFR BLD: 0.8 % (ref 0–1)
BILIRUB SERPL-MCNC: 0.2 MG/DL (ref 0.2–1.2)
BUN SERPL-MCNC: 11 MG/DL (ref 6–20)
CALCIUM SERPL-MCNC: 8.8 MG/DL (ref 8.6–10)
CHLORIDE SERPL-SCNC: 104 MMOL/L (ref 98–111)
CHOLEST SERPL-MCNC: 159 MG/DL (ref 0–199)
CO2 SERPL-SCNC: 29 MMOL/L (ref 22–29)
CREAT SERPL-MCNC: 0.5 MG/DL (ref 0.5–0.9)
EOSINOPHIL # BLD: 0.2 K/UL (ref 0–0.6)
EOSINOPHIL NFR BLD: 2 % (ref 0–5)
ERYTHROCYTE [DISTWIDTH] IN BLOOD BY AUTOMATED COUNT: 13.1 % (ref 11.5–14.5)
GLUCOSE SERPL-MCNC: 97 MG/DL (ref 70–99)
HBA1C MFR BLD: 5.5 % (ref 4–5.6)
HCT VFR BLD AUTO: 40.9 % (ref 37–47)
HDLC SERPL-MCNC: 50 MG/DL (ref 40–60)
HGB BLD-MCNC: 12.5 G/DL (ref 12–16)
IMM GRANULOCYTES # BLD: 0 K/UL
LDLC SERPL CALC-MCNC: 88 MG/DL
LYMPHOCYTES # BLD: 3.1 K/UL (ref 1.1–4.5)
LYMPHOCYTES NFR BLD: 42.2 % (ref 20–40)
MCH RBC QN AUTO: 27.5 PG (ref 27–31)
MCHC RBC AUTO-ENTMCNC: 30.6 G/DL (ref 33–37)
MCV RBC AUTO: 90.1 FL (ref 81–99)
MONOCYTES # BLD: 0.4 K/UL (ref 0–0.9)
MONOCYTES NFR BLD: 5.6 % (ref 0–10)
NEUTROPHILS # BLD: 3.6 K/UL (ref 1.5–7.5)
NEUTS SEG NFR BLD: 49.1 % (ref 50–65)
PLATELET # BLD AUTO: 275 K/UL (ref 130–400)
PMV BLD AUTO: 10.7 FL (ref 9.4–12.3)
POTASSIUM SERPL-SCNC: 4 MMOL/L (ref 3.5–5)
PROT SERPL-MCNC: 7 G/DL (ref 6.4–8.3)
RBC # BLD AUTO: 4.54 M/UL (ref 4.2–5.4)
SODIUM SERPL-SCNC: 142 MMOL/L (ref 136–145)
T4 FREE SERPL-MCNC: 1.01 NG/DL (ref 0.93–1.7)
TRIGL SERPL-MCNC: 105 MG/DL (ref 0–149)
TSH SERPL DL<=0.005 MIU/L-ACNC: 3.81 UIU/ML (ref 0.27–4.2)
WBC # BLD AUTO: 7.3 K/UL (ref 4.8–10.8)

## 2024-12-02 PROCEDURE — 99214 OFFICE O/P EST MOD 30 MIN: CPT | Performed by: NURSE PRACTITIONER

## 2024-12-02 SDOH — ECONOMIC STABILITY: FOOD INSECURITY: WITHIN THE PAST 12 MONTHS, YOU WORRIED THAT YOUR FOOD WOULD RUN OUT BEFORE YOU GOT MONEY TO BUY MORE.: NEVER TRUE

## 2024-12-02 SDOH — ECONOMIC STABILITY: FOOD INSECURITY: WITHIN THE PAST 12 MONTHS, THE FOOD YOU BOUGHT JUST DIDN'T LAST AND YOU DIDN'T HAVE MONEY TO GET MORE.: NEVER TRUE

## 2024-12-02 SDOH — ECONOMIC STABILITY: INCOME INSECURITY: HOW HARD IS IT FOR YOU TO PAY FOR THE VERY BASICS LIKE FOOD, HOUSING, MEDICAL CARE, AND HEATING?: SOMEWHAT HARD

## 2024-12-02 ASSESSMENT — ENCOUNTER SYMPTOMS
NAUSEA: 0
GASTROINTESTINAL NEGATIVE: 1
ALLERGIC/IMMUNOLOGIC NEGATIVE: 1
COUGH: 0
RESPIRATORY NEGATIVE: 1
DIARRHEA: 0
ABDOMINAL PAIN: 0
EYES NEGATIVE: 1
CONSTIPATION: 0
VOMITING: 0
ABDOMINAL DISTENTION: 0

## 2024-12-02 NOTE — PROGRESS NOTES
will know the amount of calories that your body needs every day to function appropriately.  Focus on making better nutritional choices, eliminate simple sugars or sugary drinks such as soda or tea.  Limit starchy vegetables such as potatoes or corn.  Use healthier choices of breads and pastas such as Banza pasta, Manish bread, low-carb wraps or keto bread.     Eating plans such as the Mediterranean diet, [place] diet or Trim Healthy Mama can be implemented to help guide eating habits.      Slowly increase physical exercise to include 20 to 30 minutes of moderate exercise 3 to 5 days a week.    Electronically signed by KOBE Pink NP on 12/2/2024 at 9:41 AM    EMR Dragon/transcription disclaimer:  Much of thisencounter note is electronic transcription/translation of spoken language to printed texts.  The electronic translation of spoken language may be erroneous, or at times, nonsensical words or phrases may be inadvertentlytranscribed.  Although I have reviewed the note for such errors, some may still exist.

## 2024-12-02 NOTE — PATIENT INSTRUCTIONS
Water 128 oz goal     Protein 100 grams    [email or web address]     I would recommend discussing options with a local weight loss clinic.  You may be able to qualify for one of the compounded formulas of one of the injectables through their programs. One possible consideration would be:    Jan[facility]ULLOCK   Weight management   [email or web address]      Patient is encouraged to consider a local training or weight loss program such as:  [facility] [employer] [facility] [employer] or [employer]  [facility] program  Or other weight loss programs through a local gym.     Accountability and consistency are two keys to success to long term weight loss.     BMR    10 x (weight in kilogram) + 6.25 (height in centimeters) - 5 (age) - 161 = BMR  BMR x 1.2 ( for those who are not overly active) = caloric deficit     The fundamentals of weight loss come down to the fact that calories in have to be less than calories out which results in a calorie deficit.  However healthy weight loss will also require you to meet your basal metabolic rate.  While some of us eat way too many calories, others eat way too few causing the body to store most intake due to metabolic adaptation.     Increase water intake with a goal of 1 gallon per day.    Download a calorie counting camille such as my fitness pal or other program.    Keep a journal of everything that you eat or drink for 2 weeks.    At the end of each day calculate your caloric intake.  This gives you knowledge of what you are actually getting in each day.  By calculating your BMR you will know the amount of calories that your body needs every day to function appropriately.  Focus on making better nutritional choices, eliminate simple sugars or sugary drinks such as soda or tea.  Limit starchy vegetables such as potatoes or corn.  Use healthier choices of breads and pastas such as Banza pasta, Manish bread, low-carb wraps or keto bread.     Eating plans such as

## 2025-01-13 ENCOUNTER — TELEPHONE (OUTPATIENT)
Age: 40
End: 2025-01-13

## 2025-01-13 NOTE — TELEPHONE ENCOUNTER
I returned call to patient after speaking with Brayden Vaca PAC Patient reported a knot appeared to her left side top of knee cap. She denies additional injury and denies any redness swelling or pain. Brayden encouraged patient to keep ice on her knee and if pain develops to contact the office if not for her to keep her appointment on 1/27/25 @ 10:30AM. Patient verbalized understanding

## 2025-01-27 ENCOUNTER — OFFICE VISIT (OUTPATIENT)
Age: 40
End: 2025-01-27
Payer: COMMERCIAL

## 2025-01-27 VITALS — BODY MASS INDEX: 40.18 KG/M2 | WEIGHT: 250 LBS | HEIGHT: 66 IN

## 2025-01-27 DIAGNOSIS — S80.12XD CONTUSION OF LEFT KNEE AND LOWER LEG, SUBSEQUENT ENCOUNTER: ICD-10-CM

## 2025-01-27 DIAGNOSIS — S83.419D SPRAIN OF MEDIAL COLLATERAL LIGAMENT OF KNEE, SUBSEQUENT ENCOUNTER: Primary | ICD-10-CM

## 2025-01-27 DIAGNOSIS — S80.02XD CONTUSION OF LEFT KNEE AND LOWER LEG, SUBSEQUENT ENCOUNTER: ICD-10-CM

## 2025-01-27 PROBLEM — S80.12XA CONTUSION OF LEFT KNEE AND LOWER LEG: Status: ACTIVE | Noted: 2024-11-26

## 2025-01-27 PROCEDURE — 99212 OFFICE O/P EST SF 10 MIN: CPT | Performed by: PHYSICIAN ASSISTANT

## 2025-01-27 NOTE — PROGRESS NOTES
Orthopaedic Clinic Note - Established Patient    NAME:  Rani Rossi   : 1985  MRN: [id number]      2025      CHIEF COMPLAINT:   Chief Complaint   Patient presents with    Knee Pain     Left Knee: Follow-up        HISTORY OF PRESENT ILLNESS:   Rani Rossi  returns today for follow up of the left knee contusion and grade I mcl sprain.  She is doing much better.  Since last visit, pain has improved. She is her by herself.  Ambulating without assistance or brace.       Past Medical History:        Diagnosis Date    Hypothyroidism        Past Surgical History:        Procedure Laterality Date    TONSILLECTOMY         Current Medications:   Prior to Admission medications    Medication Sig Start Date End Date Taking? Authorizing Provider   acetaminophen (TYLENOL) 500 MG tablet Take 1 tablet by mouth every 6 hours as needed for Pain   Yes Provider, MD Nupur   fluticasone (FLONASE) 50 MCG/ACT nasal spray 1 spray by Each Nostril route daily 23  Yes Brianna Andrade APRN - NP   IBUPROFEN PO Take  by mouth.   Yes ProviderNupur MD   naltrexone-buPROPion (CONTRAVE) 8-90 MG per extended release tablet Take 2 tablets by mouth 2 times daily  Patient not taking: Reported on 2025   Brianna Andrade APRN - NP   metFORMIN (GLUCOPHAGE) 500 MG tablet Take 1 tablet by mouth 2 times daily (with meals)  Patient not taking: Reported on 2025   Brianna Andrade APRN - NP       Allergies:    Pineapple    REVIEW OF SYSTEMS:   System Neg/Pos Details   Constitutional negative   Fatigue and Fever.   Respiratory negative   Cough and Dyspnea.   Cardio negative   Chest pain.   GI negative   Abdominal pain and Vomiting.    negative   Urinary incontinence.   Neuro negative   Headache.   Psych negative   Psychiatric symptoms.       PHYSICAL EXAM:    Ht 1.676 m (5' 6\")   Wt 113.4 kg (250 lb)   BMI 40.35 kg/m²     GENERAL: No distress.  ORIENTATION: Alert and oriented to time, place, person.  MOOD

## 2025-03-03 ENCOUNTER — OFFICE VISIT (OUTPATIENT)
Dept: PRIMARY CARE CLINIC | Age: 40
End: 2025-03-03
Payer: COMMERCIAL

## 2025-03-03 VITALS
DIASTOLIC BLOOD PRESSURE: 82 MMHG | BODY MASS INDEX: 41.46 KG/M2 | TEMPERATURE: 98.2 F | RESPIRATION RATE: 18 BRPM | WEIGHT: 258 LBS | HEART RATE: 88 BPM | OXYGEN SATURATION: 99 % | SYSTOLIC BLOOD PRESSURE: 124 MMHG | HEIGHT: 66 IN

## 2025-03-03 DIAGNOSIS — S83.419D SPRAIN OF MEDIAL COLLATERAL LIGAMENT OF KNEE, SUBSEQUENT ENCOUNTER: ICD-10-CM

## 2025-03-03 DIAGNOSIS — E66.01 MORBID OBESITY: Primary | ICD-10-CM

## 2025-03-03 PROCEDURE — 99213 OFFICE O/P EST LOW 20 MIN: CPT | Performed by: NURSE PRACTITIONER

## 2025-03-03 SDOH — ECONOMIC STABILITY: FOOD INSECURITY: WITHIN THE PAST 12 MONTHS, YOU WORRIED THAT YOUR FOOD WOULD RUN OUT BEFORE YOU GOT MONEY TO BUY MORE.: NEVER TRUE

## 2025-03-03 SDOH — ECONOMIC STABILITY: FOOD INSECURITY: WITHIN THE PAST 12 MONTHS, THE FOOD YOU BOUGHT JUST DIDN'T LAST AND YOU DIDN'T HAVE MONEY TO GET MORE.: NEVER TRUE

## 2025-03-03 ASSESSMENT — PATIENT HEALTH QUESTIONNAIRE - PHQ9
SUM OF ALL RESPONSES TO PHQ QUESTIONS 1-9: 0
2. FEELING DOWN, DEPRESSED OR HOPELESS: NOT AT ALL
1. LITTLE INTEREST OR PLEASURE IN DOING THINGS: NOT AT ALL

## 2025-03-03 ASSESSMENT — ENCOUNTER SYMPTOMS
WHEEZING: 0
SHORTNESS OF BREATH: 0
ALLERGIC/IMMUNOLOGIC NEGATIVE: 1
EYES NEGATIVE: 1
COUGH: 0
ABDOMINAL PAIN: 0
RESPIRATORY NEGATIVE: 1
ABDOMINAL DISTENTION: 0
GASTROINTESTINAL NEGATIVE: 1

## 2025-03-03 NOTE — PATIENT INSTRUCTIONS
can be implemented to help guide eating habits.      Slowly increase physical exercise to include 20 to 30 minutes of moderate exercise 3 to 5 days a week.

## 2025-03-03 NOTE — PROGRESS NOTES
others eat way too few causing the body to store most intake due to metabolic adaptation.     Increase water intake with a goal of 1 gallon per day.    Download a calorie counting camille such as my fitness pal or other program.    Keep a journal of everything that you eat or drink for 2 weeks.    At the end of each day calculate your caloric intake.  This gives you knowledge of what you are actually getting in each day.  By calculating your BMR you will know the amount of calories that your body needs every day to function appropriately.  Focus on making better nutritional choices, eliminate simple sugars or sugary drinks such as soda or tea.  Limit starchy vegetables such as potatoes or corn.  Use healthier choices of breads and pastas such as Banza pasta, Manish bread, low-carb wraps or keto bread.     Eating plans such as the Mediterranean diet, [place] diet or Trim Healthy Mama can be implemented to help guide eating habits.      Slowly increase physical exercise to include 20 to 30 minutes of moderate exercise 3 to 5 days a week.    Electronically signed by KOBE Pink NP on 3/3/2025 at 10:20 AM    Reviewed possibility of topiramate-phentermine combo but does not appear like her insurance will cover this.  Also consider GLP-1's but patient's insurance will not cover this either  Much of her therapy does seem to be related to cost related    More than 50% of the time was spent counseling and coordinating care for a total time of 25 min face to face.    EMR Dragon/transcription disclaimer:  Much of thisencounter note is electronic transcription/translation of spoken language to printed texts.  The electronic translation of spoken language may be erroneous, or at times, nonsensical words or phrases may be inadvertentlytranscribed.  Although I have reviewed the note for such errors, some may still exist.